# Patient Record
Sex: FEMALE | Employment: FULL TIME | URBAN - METROPOLITAN AREA
[De-identification: names, ages, dates, MRNs, and addresses within clinical notes are randomized per-mention and may not be internally consistent; named-entity substitution may affect disease eponyms.]

---

## 2018-04-05 ENCOUNTER — MYC MEDICAL ADVICE (OUTPATIENT)
Dept: FAMILY MEDICINE | Facility: CLINIC | Age: 48
End: 2018-04-05

## 2018-04-06 NOTE — TELEPHONE ENCOUNTER
From: Emmie Silverman  To: Bettie Clifford MD  Sent: 4/5/2018 9:01 PM CDT  Subject: Updates about my health    I'm planning on using [facility]'s [employer] program for weight loss. Is there anything I should be aware of before I start?

## 2019-04-05 ENCOUNTER — OFFICE VISIT (OUTPATIENT)
Dept: FAMILY MEDICINE | Facility: CLINIC | Age: 49
End: 2019-04-05

## 2019-04-05 VITALS
SYSTOLIC BLOOD PRESSURE: 106 MMHG | HEART RATE: 69 BPM | HEIGHT: 66 IN | DIASTOLIC BLOOD PRESSURE: 62 MMHG | BODY MASS INDEX: 28.28 KG/M2 | OXYGEN SATURATION: 99 % | TEMPERATURE: 98.7 F | WEIGHT: 176 LBS

## 2019-04-05 DIAGNOSIS — H93.8X1 EAR PRESSURE, RIGHT: ICD-10-CM

## 2019-04-05 DIAGNOSIS — J01.00 ACUTE NON-RECURRENT MAXILLARY SINUSITIS: Primary | ICD-10-CM

## 2019-04-05 PROCEDURE — 99213 OFFICE O/P EST LOW 20 MIN: CPT | Performed by: PHYSICIAN ASSISTANT

## 2019-04-05 ASSESSMENT — MIFFLIN-ST. JEOR: SCORE: 1447.14

## 2019-04-05 NOTE — PROGRESS NOTES
"CC: Ear pain    History:  9 days ago, started getting facial pain and nasal drainage. Did feel pressure in ears as well at that time. Then on 3/30/2019 was flying back and ears got worse, painful, especially in right ear. The following day fever, warms, chills, cough, body aches, headaches.     Fortuantely, most symptoms have resolved, but still has a lot of nasal congestion and mild wet cough., in addition to ears remaining under pressure. No obvious facial pain or pressure, but occasional teeth pain.    PMH, MEDICATIONS, ALLERGIES, SOCIAL AND FAMILY HISTORY in [facility] and reviewed by me personally.      ROS negative other than the symptoms noted above in the HPI.        Examination   /62 (BP Location: Left arm, Patient Position: Sitting, Cuff Size: Adult Large)   Pulse 69   Temp 98.7  F (37.1  C) (Oral)   Ht 1.664 m (5' 5.5\")   Wt 79.8 kg (176 lb)   SpO2 99%   BMI 28.84 kg/m         Constitutional: Sitting comfortably, in no acute distress. Vital signs noted  Eyes: pupils equal round reactive to light and accomodation, extra ocular movements intact  Ears: left: external canal and TM free of abnormalities right: mild erythema of TM, and air-fluid levels noted, but no suppuration. No rupture.   Nose: patent, without mucosal abnormalities  Mouth and throat: without erythema or lesions of the mucosa  Neck:  no adenopathy, trachea midline and normal to palpation  Cardiovascular:  regular rate and rhythm, no murmurs, clicks, or gallops  Respiratory:  normal respiratory rate and rhythm, lungs clear to auscultation  SKIN: No jaundice/pallor/rash.   Psychiatric: mentation appears normal and affect normal/bright        A/P    ICD-10-CM    1. Acute non-recurrent maxillary sinusitis J01.00 amoxicillin-clavulanate (AUGMENTIN) 875-125 MG tablet   2. Ear pressure, right H93.8X1        DISCUSSION:  Exam of right ear, not convincing for infection, but does appear to be having eustachian tube dysfunction. Recommended she " take 2-3 more days to try OTC decongestants during the daytime.     If at that time still not improving, given duration of symptoms, recommended she complete 10 day course of Augmentin. She should take this twice daily with food, and should consider taking probiotic or eating yogurt in between. Warned her of possible side effects including loose stool.  Provided her recommendations on OTC therapies based on symptoms. She should contact me in 1 week if symptoms are not improving, or sooner with any intolerable side effects or worsening symptoms.    follow up visit: As needed    [facility]

## 2019-07-26 ENCOUNTER — OFFICE VISIT (OUTPATIENT)
Dept: FAMILY MEDICINE | Facility: CLINIC | Age: 49
End: 2019-07-26

## 2019-07-26 VITALS
SYSTOLIC BLOOD PRESSURE: 104 MMHG | RESPIRATION RATE: 18 BRPM | DIASTOLIC BLOOD PRESSURE: 68 MMHG | HEIGHT: 66 IN | OXYGEN SATURATION: 97 % | WEIGHT: 186 LBS | BODY MASS INDEX: 29.89 KG/M2 | HEART RATE: 65 BPM | TEMPERATURE: 98 F

## 2019-07-26 DIAGNOSIS — Z13.1 SCREENING FOR DIABETES MELLITUS: ICD-10-CM

## 2019-07-26 DIAGNOSIS — Z12.31 ENCOUNTER FOR SCREENING MAMMOGRAM FOR BREAST CANCER: ICD-10-CM

## 2019-07-26 DIAGNOSIS — Z80.0 FAMILY HISTORY OF COLON CANCER: ICD-10-CM

## 2019-07-26 DIAGNOSIS — Z13.220 SCREENING CHOLESTEROL LEVEL: ICD-10-CM

## 2019-07-26 DIAGNOSIS — Z01.419 ENCOUNTER FOR GYNECOLOGICAL EXAMINATION WITHOUT ABNORMAL FINDING: Primary | ICD-10-CM

## 2019-07-26 LAB
CHOLEST SERPL-MCNC: 217 MG/DL (ref 0–199)
CHOLEST/HDLC SERPL: 3 {RATIO} (ref 0–5)
GLUCOSE SERPL-MCNC: 77 MG/DL (ref 60–99)
HDLC SERPL-MCNC: 64 MG/DL (ref 40–150)
HEMOGLOBIN: 14.6 G/DL (ref 11.7–15.7)
LDLC SERPL CALC-MCNC: 132 MG/DL (ref 0–130)
TRIGL SERPL-MCNC: 103 MG/DL (ref 0–149)

## 2019-07-26 PROCEDURE — 85018 HEMOGLOBIN: CPT | Performed by: FAMILY MEDICINE

## 2019-07-26 PROCEDURE — 36415 COLL VENOUS BLD VENIPUNCTURE: CPT | Performed by: FAMILY MEDICINE

## 2019-07-26 PROCEDURE — 88142 CYTOPATH C/V THIN LAYER: CPT | Mod: 90 | Performed by: FAMILY MEDICINE

## 2019-07-26 PROCEDURE — 82947 ASSAY GLUCOSE BLOOD QUANT: CPT | Performed by: FAMILY MEDICINE

## 2019-07-26 PROCEDURE — 80061 LIPID PANEL: CPT | Performed by: FAMILY MEDICINE

## 2019-07-26 PROCEDURE — 99396 PREV VISIT EST AGE 40-64: CPT | Mod: 25 | Performed by: FAMILY MEDICINE

## 2019-07-26 PROCEDURE — 90714 TD VACC NO PRESV 7 YRS+ IM: CPT | Performed by: FAMILY MEDICINE

## 2019-07-26 PROCEDURE — 90471 IMMUNIZATION ADMIN: CPT | Performed by: FAMILY MEDICINE

## 2019-07-26 SDOH — HEALTH STABILITY: MENTAL HEALTH: HOW OFTEN DO YOU HAVE A DRINK CONTAINING ALCOHOL?: 2-4 TIMES A MONTH

## 2019-07-26 SDOH — ECONOMIC STABILITY: TRANSPORTATION INSECURITY
IN THE PAST 12 MONTHS, HAS THE LACK OF TRANSPORTATION KEPT YOU FROM MEDICAL APPOINTMENTS OR FROM GETTING MEDICATIONS?: NO

## 2019-07-26 SDOH — ECONOMIC STABILITY: TRANSPORTATION INSECURITY
IN THE PAST 12 MONTHS, HAS LACK OF TRANSPORTATION KEPT YOU FROM MEETINGS, WORK, OR FROM GETTING THINGS NEEDED FOR DAILY LIVING?: NO

## 2019-07-26 SDOH — ECONOMIC STABILITY: INCOME INSECURITY: HOW HARD IS IT FOR YOU TO PAY FOR THE VERY BASICS LIKE FOOD, HOUSING, MEDICAL CARE, AND HEATING?: NOT HARD AT ALL

## 2019-07-26 SDOH — HEALTH STABILITY: MENTAL HEALTH: HOW MANY STANDARD DRINKS CONTAINING ALCOHOL DO YOU HAVE ON A TYPICAL DAY?: 1 OR 2

## 2019-07-26 SDOH — ECONOMIC STABILITY: FOOD INSECURITY: WITHIN THE PAST 12 MONTHS, YOU WORRIED THAT YOUR FOOD WOULD RUN OUT BEFORE YOU GOT MONEY TO BUY MORE.: NEVER TRUE

## 2019-07-26 SDOH — ECONOMIC STABILITY: FOOD INSECURITY: WITHIN THE PAST 12 MONTHS, THE FOOD YOU BOUGHT JUST DIDN'T LAST AND YOU DIDN'T HAVE MONEY TO GET MORE.: NEVER TRUE

## 2019-07-26 ASSESSMENT — MIFFLIN-ST. JEOR: SCORE: 1487.5

## 2019-07-26 NOTE — NURSING NOTE
Emmie is here for a fasting CPX with PAP.        Patient is here for a full physical exam.    Pre-Visit Screening :  Immunizations : not up to date - will get TD today and be up to date  Colon Screening : NA  Mammogram: is due and to be scheduled by patient  Asthma Action Test/Plan : NA  PHQ9 :  NA  GAD7 :  NA  Patient's  BMI Body mass index is 30.48 kg/m .  Questioned patient about current smoking habits.  Pt. has never smoked.  OK to leave a detailed voice message regarding today's visit Yes, phone # [phone]      ETOH screening:  Questions:  1-How often do you have a drink containing alcohol?                             2 times per month(s)  2-How many drinks containing alcohol do you have on a typical day when you are         Drinking?                              2   3- How often do you have 5 or more drinks on one occasion?                              never

## 2019-07-26 NOTE — PROGRESS NOTES
SUBJECTIVE:  Emmie Silverman is an 49 year old G 2 P 2 woman who presents for   annual gyn exam. Patient's last menstrual period was 07/05/2019. Periods are regular q 28-30 days, lasting   4 days. Dysmenorrhea:none. Cyclic symptoms   include none. No intermenstrual bleeding,   spotting, or discharge.    Current contraception: vasectomy  BLANCA exposure: no  History of abnormal Pap smear: No  Family history of uterine or ovarian cancer: No  Regular self breast exam: Yes  History of abnormal mammogram: No  Family history of breast cancer: No  History of abnormal lipids: No    No past medical history on file.    Family History   Problem Relation Age of Onset     Lipids Father      Lipids Mother      Heart Disease Father         angioplasty     Cancer Sister         basal cell carcinoma     Asthma Mother        Past Surgical History:   Procedure Laterality Date     HC DILATION/CURETTAGE DIAG/THER NON OB  2000    D & C     HC TOOTH EXTRACTION W/FORCEP      molar extraction     LASIK CUSTOMVUE BILATERAL  10/22/2012    Procedure: LASIK CUSTOMVUE BILATERAL;  BILATERAL CUSTOMVUE LASIK WITH INTRALASE  ;  Surgeon: Richar Zapata MD;  Location: [facility]     WAVESCAN SCREENING  10/22/2012    Procedure: WAVESCAN SCREENING;  BILATERAL WAVESCAN SCREENING;  Surgeon: Richar Zapata MD;  Location: [facility]     WAVESCAN SCREENING  10/14/2013    Procedure: WAVESCAN SCREENING;  BILATERAL WAVESCAN ;  Surgeon: Richar Zapata MD;  Location: [facility]     WAVESCAN SCREENING  1/22/2014    Procedure: WAVESCAN SCREENING;  BILATERAL WAVESCREEN;  Surgeon: Richar Zapata MD;  Location: [facility]       No current outpatient medications on file.     No current facility-administered medications for this visit.      Allergies   Allergen Reactions     Aspirin      hives       Social History     Tobacco Use     Smoking status: Never Smoker     Smokeless tobacco: Never Used   Substance Use Topics     Alcohol use: Yes     Alcohol/week: 0.5 oz     Types: 1  "Glasses of wine per week     Frequency: 2-4 times a month     Drinks per session: 1 or 2     Comment: wine       Review Of Systems  Ears/Nose/Throat: negative  Respiratory: No shortness of breath, dyspnea on exertion, cough, or hemoptysis  Cardiovascular: negative  Gastrointestinal: requests early colonoscopy due to family history  Genitourinary: negative    OBJECTIVE:  /68 (BP Location: Right arm, Patient Position: Sitting, Cuff Size: Adult Large)   Pulse 65   Temp 98  F (36.7  C) (Oral)   Ht 1.664 m (5' 5.5\")   Wt 84.4 kg (186 lb)   LMP 07/05/2019   SpO2 97%   BMI 30.48 kg/m    General appearance: healthy, alert, no distress, cooperative and smiling  Skin: Skin color, texture, turgor normal. No rashes or lesions.  Ears: negative  Nose/Sinuses: Nares normal. Septum midline. Mucosa normal. No drainage or sinus tenderness.  Oropharynx: Lips, mucosa, and tongue normal. Teeth and gums normal.  Neck: Neck supple. No adenopathy. Thyroid symmetric, normal size,, Carotids without bruits.  Lungs: negative, Percussion normal. Good diaphragmatic excursion. Lungs clear  Heart: negative, PMI normal. No lifts, heaves, or thrills. RRR. No murmurs, clicks gallops or rub  Breasts: Inspection negative. No nipple discharge or bleeding. No masses.  Abdomen: Abdomen soft, non-tender. BS normal. No masses, organomegaly  Pelvic: External genitalia and vagina normal. Bimanual and rectovaginal normal.  BMI : Body mass index is 30.48 kg/m .    ASSESSMENT:(Z01.419) Encounter for gynecological examination without abnormal finding  (primary encounter diagnosis)  Plan: ThinPrep Pap and HPV (mRNA E6/E7)         ([facility]), CL AFF HEMOGLOBIN (BFP), VENOUS         COLLECTION        Colonoscopy recommended  Exercise encouraged  Fasting lipid profile obtained  Mammogram recommended  PAP smear obtained  Routine breast self-exam encouraged  Seat belt use encouraged  Sunscreen recommended  Await labs      (Z12.31) Encounter for screening " mammogram for breast cancer  Plan: Mammo Screening digital (bilat)            (Z80.0) Family history of colon cancer  Plan: GASTROENTEROLOGY ADULT REF PROCEDURE ONLY         Other; [facility] [phone]        Check with your plan    (Z13.1) Screening for diabetes mellitus  Plan: Glucose Fasting (BFP), VENOUS COLLECTION            (Z13.220) Screening cholesterol level  Plan: Lipid Panel (BFP), VENOUS COLLECTION                Dx:  1)  Pap smear  2)  Mammography, lipids at appropriate intervals      PE:  Reviewed health maintenance including diet, regular exercise   and periodic exams.

## 2019-07-31 LAB
CLINICAL HISTORY - QUEST: NORMAL
COMMENT - QUEST: NORMAL
CYTOTECHNOLOGIST - QUEST: NORMAL
DESCRIPTIVE DIAGNOSIS - QUEST: NORMAL
LAST PAP DX - QUEST: NORMAL
LMP - QUEST: NORMAL
PREV BX DX - QUEST: NORMAL
SOURCE: NORMAL
STATEMENT OF ADEQUACY - QUEST: NORMAL

## 2019-08-02 DIAGNOSIS — Z12.31 ENCOUNTER FOR SCREENING MAMMOGRAM FOR BREAST CANCER: ICD-10-CM

## 2019-08-02 LAB — MAMMOGRAM: NORMAL

## 2019-09-27 ENCOUNTER — HEALTH MAINTENANCE LETTER (OUTPATIENT)
Age: 49
End: 2019-09-27

## 2021-01-09 ENCOUNTER — HEALTH MAINTENANCE LETTER (OUTPATIENT)
Age: 51
End: 2021-01-09

## 2021-03-13 ENCOUNTER — HEALTH MAINTENANCE LETTER (OUTPATIENT)
Age: 51
End: 2021-03-13

## 2021-10-23 ENCOUNTER — HEALTH MAINTENANCE LETTER (OUTPATIENT)
Age: 51
End: 2021-10-23

## 2021-11-15 ENCOUNTER — OFFICE VISIT (OUTPATIENT)
Dept: FAMILY MEDICINE | Facility: CLINIC | Age: 51
End: 2021-11-15

## 2021-11-15 VITALS
DIASTOLIC BLOOD PRESSURE: 74 MMHG | TEMPERATURE: 97.6 F | BODY MASS INDEX: 33.56 KG/M2 | OXYGEN SATURATION: 97 % | WEIGHT: 208.8 LBS | SYSTOLIC BLOOD PRESSURE: 108 MMHG | HEIGHT: 66 IN | HEART RATE: 86 BPM

## 2021-11-15 DIAGNOSIS — M25.551 HIP PAIN, RIGHT: ICD-10-CM

## 2021-11-15 DIAGNOSIS — Z11.4 SCREENING FOR HIV (HUMAN IMMUNODEFICIENCY VIRUS): ICD-10-CM

## 2021-11-15 DIAGNOSIS — Z11.59 NEED FOR HEPATITIS C SCREENING TEST: ICD-10-CM

## 2021-11-15 DIAGNOSIS — Z00.00 ROUTINE GENERAL MEDICAL EXAMINATION AT A HEALTH CARE FACILITY: Primary | ICD-10-CM

## 2021-11-15 DIAGNOSIS — Z12.11 SCREEN FOR COLON CANCER: ICD-10-CM

## 2021-11-15 DIAGNOSIS — Z80.41 FAMILY HISTORY OF MALIGNANT NEOPLASM OF OVARY: ICD-10-CM

## 2021-11-15 DIAGNOSIS — K42.9 UMBILICAL HERNIA WITHOUT OBSTRUCTION AND WITHOUT GANGRENE: ICD-10-CM

## 2021-11-15 DIAGNOSIS — Z80.0 FAMILY HISTORY OF COLON CANCER: ICD-10-CM

## 2021-11-15 DIAGNOSIS — Z12.31 VISIT FOR SCREENING MAMMOGRAM: ICD-10-CM

## 2021-11-15 DIAGNOSIS — H90.3 BILATERAL SENSORINEURAL HEARING LOSS: ICD-10-CM

## 2021-11-15 DIAGNOSIS — R10.11 ABDOMINAL PAIN, RIGHT UPPER QUADRANT: ICD-10-CM

## 2021-11-15 LAB
ALBUMIN SERPL-MCNC: 4.7 G/DL (ref 3.6–5.1)
ALBUMIN/GLOB SERPL: 1.7 {RATIO} (ref 1–2.5)
ALP SERPL-CCNC: 76 U/L (ref 33–130)
ALT 1742-6: 10 U/L (ref 0–32)
AST 1920-8: 14 U/L (ref 0–35)
BILIRUB SERPL-MCNC: 0.8 MG/DL (ref 0.2–1.2)
BUN SERPL-MCNC: 9 MG/DL (ref 7–25)
BUN/CREATININE RATIO: 12 (ref 6–22)
CALCIUM SERPL-MCNC: 9.2 MG/DL (ref 8.6–10.3)
CHLORIDE SERPLBLD-SCNC: 106.9 MMOL/L (ref 98–110)
CHOLEST SERPL-MCNC: 226 MG/DL (ref 0–199)
CHOLEST/HDLC SERPL: 3 {RATIO} (ref 0–5)
CO2 SERPL-SCNC: 27.6 MMOL/L (ref 20–32)
CREAT SERPL-MCNC: 0.75 MG/DL (ref 0.6–1.3)
GLOBULIN, CALCULATED - QUEST: 2.7 (ref 1.9–3.7)
GLUCOSE SERPL-MCNC: 104 MG/DL (ref 60–99)
HDLC SERPL-MCNC: 65 MG/DL (ref 40–150)
LDLC SERPL CALC-MCNC: 140 MG/DL (ref 0–130)
POTASSIUM SERPL-SCNC: 4.3 MMOL/L (ref 3.5–5.3)
PROT SERPL-MCNC: 7.4 G/DL (ref 6.1–8.1)
SODIUM SERPL-SCNC: 141.3 MMOL/L (ref 135–146)
TRIGL SERPL-MCNC: 107 MG/DL (ref 0–149)

## 2021-11-15 PROCEDURE — 80053 COMPREHEN METABOLIC PANEL: CPT | Performed by: FAMILY MEDICINE

## 2021-11-15 PROCEDURE — 36415 COLL VENOUS BLD VENIPUNCTURE: CPT | Performed by: FAMILY MEDICINE

## 2021-11-15 PROCEDURE — 86803 HEPATITIS C AB TEST: CPT | Mod: 90 | Performed by: FAMILY MEDICINE

## 2021-11-15 PROCEDURE — 80061 LIPID PANEL: CPT | Performed by: FAMILY MEDICINE

## 2021-11-15 PROCEDURE — 87389 HIV-1 AG W/HIV-1&-2 AB AG IA: CPT | Mod: 90 | Performed by: FAMILY MEDICINE

## 2021-11-15 PROCEDURE — 99214 OFFICE O/P EST MOD 30 MIN: CPT | Mod: 25 | Performed by: FAMILY MEDICINE

## 2021-11-15 PROCEDURE — 99396 PREV VISIT EST AGE 40-64: CPT | Mod: 25 | Performed by: FAMILY MEDICINE

## 2021-11-15 ASSESSMENT — MIFFLIN-ST. JEOR: SCORE: 1580.92

## 2021-11-15 NOTE — NURSING NOTE
Chief Complaint   Patient presents with     Physical     fasting today      Hernia     soreness around belly button     Pain     lower abdominal pain in right quadrant, wraps around to her back, occurs on and off      Bursitis     right sided hip pain     Pre-visit Screening:  Immunizations:  up to date  Colonoscopy:  NA  Mammogram: is due and ordered today  Asthma Action Test/Plan:  NA  PHQ9:  NA  GAD7:  NA  Questioned patient about current smoking habits Pt. has never smoked.  Ok to leave detailed message on voice mail for today's visit only Yes, phone # [phone]

## 2021-11-15 NOTE — PATIENT INSTRUCTIONS
Preventive Health Recommendations  Female Ages 40 to 49    Yearly exam:     See your health care provider every year in order to  1. Review health changes.   2. Discuss preventive care.    3. Review your medicines if your doctor prescribed any.      Get a Pap test every three years (unless you have an abnormal result and your provider advises testing more often).      If you get Pap tests with HPV test, you only need to test every 5 years, unless you have an abnormal result. You do not need a Pap test if your uterus was removed (hysterectomy) and you have not had cancer.      You should be tested each year for STDs (sexually transmitted diseases), if you're at risk.     Ask your doctor if you should have a mammogram.      Have a colonoscopy (test for colon cancer) if someone in your family has had colon cancer or polyps before age 50.       Have a cholesterol test every 5 years.       Have a diabetes test (fasting glucose) after age 45. If you are at risk for diabetes, you should have this test every 3 years.    Shots: Get a flu shot each year. Get a tetanus shot every 10 years.     Nutrition:     Eat at least 5 servings of fruits and vegetables each day.    Eat whole-grain bread, whole-wheat pasta and brown rice instead of white grains and rice.    Get adequate Calcium and Vitamin D.      Lifestyle    Exercise at least 150 minutes a week (an average of 30 minutes a day, 5 days a week). This will help you control your weight and prevent disease.    Limit alcohol to one drink per day.    No smoking.     Wear sunscreen to prevent skin cancer.    See your dentist every six months for an exam and cleaning.  ASSESSMENT/PLAN:   1. Routine general medical examination at a health care facility    - VENOUS COLLECTION  - Lipid Panel (BFP)    2. Visit for screening mammogram    - Mammo Screening digital (bilat)    3. Screen for colon cancer  She said she is due again now, will call the office where she had it to  "reschedule.    4. Family history of colon cancer  Referral for genetic counseling.  - Cancer Risk Mgmt/Cancer Genetic Counseling Referral; Future    5. Abdominal pain, right upper quadrant  Check labs. Schedule ultrasound.  - Radiology Referral  - Comprehensive Metobolic Panel (BFP)  - US Abdomen Complete; Future    6. Screening for HIV (human immunodeficiency virus)    - HIV 1/2 Agn Kristen 4th Gen w Reflex ([facility])    7. Need for hepatitis C screening test    - HEPATITIS C ANTIBODY ([facility])    8. Family history of malignant neoplasm of ovary  Genetic counseling.  - Cancer Risk Mgmt/Cancer Genetic Counseling Referral; Future    9. Hip pain, right  She will schedule followup apt for this.    10. Umbilical hernia without obstruction and without gangrene  Recommend referral to general surgeon. She will wait on this for now.    11. Bilateral sensorineural hearing loss  Schedule with hearing health    Patient has been advised of split billing requirements and indicates understanding: Yes  COUNSELING:  Reviewed preventive health counseling, as reflected in patient instructions       Regular exercise       Healthy diet/nutrition    Estimated body mass index is 34.22 kg/m  as calculated from the following:    Height as of this encounter: 1.664 m (5' 5.5\").    Weight as of this encounter: 94.7 kg (208 lb 12.8 oz).    Weight management plan: Discussed healthy diet and exercise guidelines    She reports that she has never smoked. She has never used smokeless tobacco.        Jyotsna De La Cruz MD  [facility]      "

## 2021-11-15 NOTE — PROGRESS NOTES
SUBJECTIVE:   CC: Emmie Silverman is an 51 year old woman who presents for preventive health visit.       Patient has been advised of split billing requirements and indicates understanding: Yes  HPI    Here for a physical.  Is doing well.  Went with her mother for genetic counseliing. She hasn't done it herself. Has several positive cancers in her family.    Pain around her belly button at times, hurts with situps. Not sure if this is a hernia. Not sure if there is a lump. Sometimes it feels hard in there.    occ has gallbladder stuff, has had this for years. Can control with diet. Sometimes will be uncomfortable  Then it goes away. Pain after eating, feels tight and uncomfortable. Hasn't had ultrasound.    Wants to have a hearting check. Used to be a drummer. Harder with masks also.  Also has tinnitus.    Also some right hip pain.          Today's PHQ-2 Score:   PHQ-2 ( 1999 Pfizer) 11/15/2021   Q1: Little interest or pleasure in doing things 0   Q2: Feeling down, depressed or hopeless 0   PHQ-2 Score 0         Do you feel safe in your environment? Yes    Have you ever done Advance Care Planning? (For example, a Health Directive, POLST, or a discussion with a medical provider or your loved ones about your wishes): Yes, patient states has an Advance Care Planning document and will bring a copy to the clinic.    Social History     Tobacco Use     Smoking status: Never Smoker     Smokeless tobacco: Never Used   Substance Use Topics     Alcohol use: Yes     Comment: wine 1-2 drinks a week         No flowsheet data found.    Reviewed orders with patient.  Reviewed health maintenance and updated orders accordingly - Yes  BP Readings from Last 3 Encounters:   11/15/21 108/74   07/26/19 104/68   04/05/19 106/62    Wt Readings from Last 3 Encounters:   11/15/21 94.7 kg (208 lb 12.8 oz)   07/26/19 84.4 kg (186 lb)   04/05/19 79.8 kg (176 lb)                  Patient Active Problem List   Diagnosis     Exposure to noise      Bee sting reaction     Health Care Home     ACP (advance care planning)     Obesity due to excess calories, unspecified obesity severity     Screen for colon cancer     Family history of malignant neoplasm of ovary     Umbilical hernia without obstruction and without gangrene     Past Surgical History:   Procedure Laterality Date     HC DILATION/CURETTAGE DIAG/THER NON OB  2000    D & C     HC TOOTH EXTRACTION W/FORCEP      molar extraction     LASIK CUSTOMVUE BILATERAL  10/22/2012    Procedure: LASIK CUSTOMVUE BILATERAL;  BILATERAL CUSTOMVUE LASIK WITH INTRALASE  ;  Surgeon: Richar Zapata MD;  [facility]     WAVESCAN SCREENING  10/22/2012    Procedure: WAVESCAN SCREENING;  BILATERAL WAVESCAN SCREENING;  Surgeon: Richar Zapata MD;  [facility]     WAVESCAN SCREENING  10/14/2013    Procedure: WAVESCAN SCREENING;  BILATERAL WAVESCAN ;  Surgeon: Richar Zapata MD;  [facility]     WAVESCAN SCREENING  1/22/2014    Procedure: WAVESCAN SCREENING;  BILATERAL WAVESCREEN;  Surgeon: Richar Zapata MD;  Location: [facility]       Social History     Tobacco Use     Smoking status: Never Smoker     Smokeless tobacco: Never Used   Substance Use Topics     Alcohol use: Yes     Comment: wine 1-2 drinks a week     Family History   Problem Relation Age of Onset     Lipids Mother      Asthma Mother      Ovarian Cancer Mother         no genetic     Lipids Father      Heart Disease Father         angioplasty     Alzheimer Disease Sister      Cancer Sister         basal cell carcinoma     Colon Cancer Cousin         diagnose age 52     Ovarian Cancer Cousin         diagnosed early 50s         No current outpatient medications on file.       Breast Cancer Screening:  Any new diagnosis of family breast, ovarian, or bowel cancer?     FHS-7: No flowsheet data found.  History normal.    Pertinent mammograms are reviewed under the imaging tab.    History of abnormal Pap smear: history normal, up to date  Still gets  "her periods,  with vasectomy     Reviewed and updated as needed this visit by clinical staff  Tobacco    Problems            Reviewed and updated as needed this visit by Provider               No past medical history on file.   Past Surgical History:   Procedure Laterality Date     HC DILATION/CURETTAGE DIAG/THER NON OB  2000    D & C     HC TOOTH EXTRACTION W/FORCEP      molar extraction     LASIK CUSTOMVUE BILATERAL  10/22/2012    Procedure: LASIK CUSTOMVUE BILATERAL;  BILATERAL CUSTOMVUE LASIK WITH INTRALASE  ;  Surgeon: Richar Zapata MD;  [facility]     WAVESCAN SCREENING  10/22/2012    Procedure: WAVESCAN SCREENING;  BILATERAL WAVESCAN SCREENING;  Surgeon: Richar Zapata MD;  [facility]     WAVESCAN SCREENING  10/14/2013    Procedure: WAVESCAN SCREENING;  BILATERAL WAVESCAN ;  Surgeon: Richar Zapata MD;  [facility]     WAVESCAN SCREENING  1/22/2014    Procedure: WAVESCAN SCREENING;  BILATERAL WAVESCREEN;  Surgeon: Richar Zapata MD;  Location: [facility]       Review of Systems  CONSTITUTIONAL: NEGATIVE for fever, chills, change in weight  INTEGUMENTARY/SKIN: NEGATIVE for worrisome rashes, moles or lesions  EYES: NEGATIVE for vision changes or irritation  ENT: NEGATIVE for ear, mouth and throat problems  RESP: NEGATIVE for significant cough or SOB  BREAST: NEGATIVE for masses, tenderness or discharge  CV: NEGATIVE for chest pain, palpitations or peripheral edema  GI: NEGATIVE for nausea, abdominal pain, heartburn, or change in bowel habits  : NEGATIVE for unusual urinary or vaginal symptoms. No vaginal bleeding.  MUSCULOSKELETAL: NEGATIVE for significant arthralgias or myalgia  NEURO: NEGATIVE for weakness, dizziness or paresthesias  PSYCHIATRIC: NEGATIVE for changes in mood or affect        OBJECTIVE:   /74 (BP Location: Right arm, Patient Position: Sitting, Cuff Size: Adult Large)   Pulse 86   Temp 97.6  F (36.4  C) (Temporal)   Ht 1.664 m (5' 5.5\")   Wt 94.7 kg " (208 lb 12.8 oz)   LMP 10/25/2021   SpO2 97%   BMI 34.22 kg/m    Physical Exam  GENERAL: healthy, alert and no distress  EYES: Eyes grossly normal to inspection, PERRL and conjunctivae and sclerae normal  HENT: ear canals and TM's normal, nose and mouth without ulcers or lesions  NECK: no adenopathy, no asymmetry, masses, or scars and thyroid normal to palpation  RESP: lungs clear to auscultation - no rales, rhonchi or wheezes  BREAST: normal without masses, tenderness or nipple discharge and no palpable axillary masses or adenopathy  CV: regular rate and rhythm, normal S1 S2, no S3 or S4, no murmur, click or rub, no peripheral edema and peripheral pulses strong  ABDOMEN: soft, nontender, no hepatosplenomegaly, no masses and bowel sounds normal   (female): normal female external genitalia, normal urethral meatus, vaginal mucosa pink, moist, well rugated, and normal cervix/adnexa/uterus without masses or discharge  MS: no gross musculoskeletal defects noted, no edema  SKIN: no suspicious lesions or rashes  NEURO: Normal strength and tone, mentation intact and speech normal  PSYCH: mentation appears normal, affect normal/bright  Non tender right upper abd  Umbilical area--non tender, no obvious hernia    Diagnostic Test Results:  Labs reviewed in Epic  No results found for any visits on 11/15/21.    ASSESSMENT/PLAN:   1. Routine general medical examination at a health care facility    - VENOUS COLLECTION  - Lipid Panel (BFP)    2. Visit for screening mammogram    - Mammo Screening digital (bilat)    3. Screen for colon cancer  She said she is due again now, will call the office where she had it to reschedule.    4. Family history of colon cancer  Referral for genetic counseling.  - Cancer Risk Mgmt/Cancer Genetic Counseling Referral; Future    5. Abdominal pain, right upper quadrant  Check labs. Schedule ultrasound.  - Radiology Referral  - Comprehensive Metobolic Panel (BFP)  - US Abdomen Complete; Future    6.  "Screening for HIV (human immunodeficiency virus)    - HIV 1/2 Agn Kristen 4th Gen w Reflex ([facility])    7. Need for hepatitis C screening test    - HEPATITIS C ANTIBODY ([facility])    8. Family history of malignant neoplasm of ovary  Genetic counseling.  - Cancer Risk Mgmt/Cancer Genetic Counseling Referral; Future    9. Hip pain, right  She will schedule followup apt for this.    10. Umbilical hernia without obstruction and without gangrene  Recommend referral to general surgeon. She will wait on this for now.    11. Bilateral sensorineural hearing loss  Schedule with hearing health    Patient has been advised of split billing requirements and indicates understanding: Yes  COUNSELING:  Reviewed preventive health counseling, as reflected in patient instructions       Regular exercise       Healthy diet/nutrition    Estimated body mass index is 34.22 kg/m  as calculated from the following:    Height as of this encounter: 1.664 m (5' 5.5\").    Weight as of this encounter: 94.7 kg (208 lb 12.8 oz).    Weight management plan: Discussed healthy diet and exercise guidelines    She reports that she has never smoked. She has never used smokeless tobacco.        Jyotsna De La Cruz MD  [facility]  "

## 2021-11-16 LAB
HCV AB - QUEST: NORMAL
HIV 1/2 AGN ABY 4TH GEN WITH REFLEX: NORMAL
SIGNAL TO CUT OFF - QUEST: 0.02

## 2022-01-07 ENCOUNTER — TRANSFERRED RECORDS (OUTPATIENT)
Dept: FAMILY MEDICINE | Facility: CLINIC | Age: 52
End: 2022-01-07

## 2022-04-09 ENCOUNTER — HEALTH MAINTENANCE LETTER (OUTPATIENT)
Age: 52
End: 2022-04-09

## 2022-06-07 ENCOUNTER — OFFICE VISIT (OUTPATIENT)
Dept: FAMILY MEDICINE | Facility: CLINIC | Age: 52
End: 2022-06-07

## 2022-06-07 VITALS
HEART RATE: 75 BPM | TEMPERATURE: 97.4 F | HEIGHT: 66 IN | DIASTOLIC BLOOD PRESSURE: 76 MMHG | SYSTOLIC BLOOD PRESSURE: 112 MMHG | OXYGEN SATURATION: 98 % | BODY MASS INDEX: 33.75 KG/M2 | WEIGHT: 210 LBS

## 2022-06-07 DIAGNOSIS — Z86.16 PERSONAL HISTORY OF COVID-19: ICD-10-CM

## 2022-06-07 DIAGNOSIS — R21 RASH: Primary | ICD-10-CM

## 2022-06-07 PROCEDURE — 99214 OFFICE O/P EST MOD 30 MIN: CPT | Performed by: FAMILY MEDICINE

## 2022-06-07 RX ORDER — HYDROXYZINE PAMOATE 25 MG/1
25 CAPSULE ORAL 3 TIMES DAILY PRN
Qty: 30 CAPSULE | Refills: 0 | Status: SHIPPED | OUTPATIENT
Start: 2022-06-07 | End: 2022-11-18

## 2022-06-07 NOTE — PROGRESS NOTES
"Assessment & Plan   Problem List Items Addressed This Visit    None     Visit Diagnoses     Rash    -  Primary    Relevant Medications    hydrOXYzine (VISTARIL) 25 MG capsule    Personal history of COVID-19             1. Rash  This looks to be very consistent with viral rash, continue to treat symptoms, she can also take   - hydrOXYzine (VISTARIL) 25 MG capsule; Take 1 capsule (25 mg) by mouth 3 times daily as needed for itching  Dispense: 30 capsule; Refill: 0    2. Personal history of COVID-19             BMI:   Estimated body mass index is 34.41 kg/m  as calculated from the following:    Height as of this encounter: 1.664 m (5' 5.5\").    Weight as of this encounter: 95.3 kg (210 lb).         FUTURE APPOINTMENTS:       - Follow-up visit as needed, if changes or worsening symptoms.    No follow-ups on file.    Jyotsna De La Cruz MD  [facility]    Subjective     Nursing Notes:   Roselyn Bautista CMA  6/7/2022 10:54 AM  Signed  Chief Complaint   Patient presents with     Rash     POS covid on 05/28, has been feeling fine no major symptoms, yesterday noticed itching on chest, rash has now spread to entire body, itchy and tingling sensation, red and splotchy like rash      Pre-visit Screening:  Immunizations:  up to date  Colonoscopy:  is up to date  Mammogram: is up to date  Asthma Action Test/Plan:  NA  PHQ9:  NA  GAD7:  NA  Questioned patient about current smoking habits Pt. has never smoked.  Ok to leave detailed message on voice mail for today's visit only Yes, phone # [phone]           Emmie Silverman is a 51 year old female who presents to clinic today for the following health issues   HPI     She is on day 10 of covid. She has a rash. Started with some itching a couple of days ago and the rash has gradually spread. Now its on her neck, face and torso, upper thighs and both arms. It's patchy. She tried some benadryl last night and then claritin this morning, which she already takes " "regularly for allergies. Not sure if this made a difference.  No fevers. Her covid symptoms were otherwise very mild and more like a cold.        Review of Systems   Constitutional, HEENT, cardiovascular, pulmonary, gi and gu systems are negative, except as otherwise noted.      Objective    /76 (BP Location: Right arm, Patient Position: Sitting, Cuff Size: Adult Large)   Pulse 75   Temp 97.4  F (36.3  C) (Temporal)   Ht 1.664 m (5' 5.5\")   Wt 95.3 kg (210 lb)   SpO2 98%   BMI 34.41 kg/m    Body mass index is 34.41 kg/m .  Physical Exam   GENERAL: healthy, alert and no distress  MS: no gross musculoskeletal defects noted, no edema  NEURO: Normal strength and tone, mentation intact and speech normal  PSYCH: mentation appears normal, affect normal/bright  Extensive raised red rash, lacy, on torso, chest, face/neck, upper thighs and arms          "

## 2022-06-07 NOTE — NURSING NOTE
Chief Complaint   Patient presents with     Rash     POS covid on 05/28, has been feeling fine no major symptoms, yesterday noticed itching on chest, rash has now spread to entire body, itchy and tingling sensation, red and splotchy like rash      Pre-visit Screening:  Immunizations:  up to date  Colonoscopy:  is up to date  Mammogram: is up to date  Asthma Action Test/Plan:  NA  PHQ9:  NA  GAD7:  NA  Questioned patient about current smoking habits Pt. has never smoked.  Ok to leave detailed message on voice mail for today's visit only Yes, phone # [phone]

## 2022-10-09 ENCOUNTER — HEALTH MAINTENANCE LETTER (OUTPATIENT)
Age: 52
End: 2022-10-09

## 2022-11-18 ENCOUNTER — OFFICE VISIT (OUTPATIENT)
Dept: FAMILY MEDICINE | Facility: CLINIC | Age: 52
End: 2022-11-18

## 2022-11-18 VITALS
TEMPERATURE: 97.9 F | SYSTOLIC BLOOD PRESSURE: 118 MMHG | HEART RATE: 87 BPM | OXYGEN SATURATION: 97 % | WEIGHT: 224.4 LBS | DIASTOLIC BLOOD PRESSURE: 78 MMHG | BODY MASS INDEX: 36.07 KG/M2 | HEIGHT: 66 IN

## 2022-11-18 DIAGNOSIS — E66.09 OBESITY DUE TO EXCESS CALORIES, UNSPECIFIED OBESITY SEVERITY: ICD-10-CM

## 2022-11-18 DIAGNOSIS — Z12.4 SCREENING FOR CERVICAL CANCER: ICD-10-CM

## 2022-11-18 DIAGNOSIS — Z80.41 FAMILY HISTORY OF MALIGNANT NEOPLASM OF OVARY: ICD-10-CM

## 2022-11-18 DIAGNOSIS — Z00.00 ENCOUNTER FOR ROUTINE HISTORY AND PHYSICAL EXAM IN FEMALE PATIENT: Primary | ICD-10-CM

## 2022-11-18 DIAGNOSIS — R10.31 ABDOMINAL PAIN, RIGHT LOWER QUADRANT: ICD-10-CM

## 2022-11-18 DIAGNOSIS — M25.551 HIP PAIN, RIGHT: ICD-10-CM

## 2022-11-18 DIAGNOSIS — R91.8 PULMONARY NODULES: ICD-10-CM

## 2022-11-18 PROBLEM — Z12.11 SCREEN FOR COLON CANCER: Status: RESOLVED | Noted: 2021-11-15 | Resolved: 2022-11-18

## 2022-11-18 LAB
CHOLEST SERPL-MCNC: 228 MG/DL (ref 0–199)
CHOLEST/HDLC SERPL: 4 {RATIO} (ref 0–5)
GLUCOSE SERPL-MCNC: 78 MG/DL (ref 60–99)
HDLC SERPL-MCNC: 56 MG/DL (ref 40–150)
LDLC SERPL CALC-MCNC: 146 MG/DL (ref 0–130)
TRIGL SERPL-MCNC: 128 MG/DL (ref 0–149)

## 2022-11-18 PROCEDURE — 99396 PREV VISIT EST AGE 40-64: CPT | Mod: 25 | Performed by: FAMILY MEDICINE

## 2022-11-18 PROCEDURE — 36415 COLL VENOUS BLD VENIPUNCTURE: CPT | Performed by: FAMILY MEDICINE

## 2022-11-18 PROCEDURE — 82947 ASSAY GLUCOSE BLOOD QUANT: CPT | Performed by: FAMILY MEDICINE

## 2022-11-18 PROCEDURE — 87624 HPV HI-RISK TYP POOLED RSLT: CPT | Mod: 90 | Performed by: FAMILY MEDICINE

## 2022-11-18 PROCEDURE — 99214 OFFICE O/P EST MOD 30 MIN: CPT | Mod: 25 | Performed by: FAMILY MEDICINE

## 2022-11-18 PROCEDURE — 80061 LIPID PANEL: CPT | Performed by: FAMILY MEDICINE

## 2022-11-18 PROCEDURE — 88142 CYTOPATH C/V THIN LAYER: CPT | Mod: 90 | Performed by: FAMILY MEDICINE

## 2022-11-18 RX ORDER — TIRZEPATIDE 2.5 MG/.5ML
INJECTION, SOLUTION SUBCUTANEOUS
COMMUNITY
Start: 2022-11-16 | End: 2023-11-21

## 2022-11-18 NOTE — PROGRESS NOTES
SUBJECTIVE:   CC: Emmie is an 52 year old who presents for preventive health visit.   Patient has been advised of split billing requirements and indicates understanding: Yes  HPI    Here for a physical.    Is working with a weight loss clinic, working with a program and is using mounjaro injections for weight loss.     Has right hip pain, for over a year, got better but then seemed to get worse again. She brought this up last year and was going to come back to talk about this further. Really notices this at night.    Vaginal dryness--but is still getting periods except over the past month or so. Sex is more painful, doesn't want estrogen now.    Will be seeing genetic counselor.    Also has had pain in right abd, has had the pain for years. Last year did a us. Not sure if this is related to her hip. The us was normal.  Seems to be in the right side of the abd. Weird aches and pains.doesn't seem to have anything to do with eating. Has tried to improve her diet. But the pain still seems to come every once in awhile.        Today's PHQ-2 Score:   PHQ-2 ( 1999 Pfizer) 6/7/2022   Q1: Little interest or pleasure in doing things 0   Q2: Feeling down, depressed or hopeless 0   PHQ-2 Score 0   PHQ-2 Total Score (12-17 Years)- Positive if 3 or more points; Administer PHQ-A if positive -           Social History     Tobacco Use     Smoking status: Never     Smokeless tobacco: Never   Substance Use Topics     Alcohol use: Yes     Alcohol/week: 1.0 standard drink     Types: 1 Glasses of wine per week     No concerns    No flowsheet data found.    Reviewed orders with patient.  Reviewed health maintenance and updated orders accordingly - Yes  BP Readings from Last 3 Encounters:   11/18/22 118/78   06/07/22 112/76   11/15/21 108/74    Wt Readings from Last 3 Encounters:   11/18/22 101.8 kg (224 lb 6.4 oz)   06/07/22 95.3 kg (210 lb)   11/15/21 94.7 kg (208 lb 12.8 oz)                  Patient Active Problem List   Diagnosis      Bee sting reaction     Health Care Home     ACP (advance care planning)     Obesity due to excess calories, unspecified obesity severity     Family history of malignant neoplasm of ovary     Umbilical hernia without obstruction and without gangrene     Past Surgical History:   Procedure Laterality Date     HC DILATION/CURETTAGE DIAG/THER NON OB  2000    D & C     HC TOOTH EXTRACTION W/FORCEP      molar extraction     LASIK CUSTOMVUE BILATERAL  10/22/2012    Procedure: LASIK CUSTOMVUE BILATERAL;  BILATERAL CUSTOMVUE LASIK WITH INTRALASE  ;  Surgeon: Richar Zapata MD;  [facility]     WAVESCAN SCREENING  10/22/2012    Procedure: WAVESCAN SCREENING;  BILATERAL WAVESCAN SCREENING;  Surgeon: Richar Zapata MD;  Location:  [facility]     WAVESCAN SCREENING  10/14/2013    Procedure: WAVESCAN SCREENING;  BILATERAL WAVESCAN ;  Surgeon: Richar Zapata MD;  Location:  [facility]     WAVESCAN SCREENING  1/22/2014    Procedure: WAVESCAN SCREENING;  BILATERAL WAVESCREEN;  Surgeon: Richar Zapata MD;  Location: [facility]       Social History     Tobacco Use     Smoking status: Never     Smokeless tobacco: Never   Substance Use Topics     Alcohol use: Yes     Alcohol/week: 1.0 standard drink     Types: 1 Glasses of wine per week     Family History   Problem Relation Age of Onset     Lipids Mother      Asthma Mother      Ovarian Cancer Mother         no genetic     Lipids Father      Heart Disease Father         angioplasty     Alzheimer Disease Sister      Cancer Sister         basal cell carcinoma     Colon Cancer Cousin         diagnose age 52     Ovarian Cancer Cousin         diagnosed early 50s         Current Outpatient Medications   Medication Sig Dispense Refill     MOUNJARO 2.5 MG/0.5ML SOPN INJECT 2.5 MG UNDER THE SKIN ONCE WEEKLY FOR  4 WEEK FOR THE FIRST MONTH.         Breast Cancer Screening:  Any new diagnosis of family breast, ovarian, or bowel cancer? Yes       FHS-7:   Breast CA Risk Assessment (FHS-7)  11/18/2022   Did any of your first-degree relatives have breast or ovarian cancer? Yes   Did any of your relatives have bilateral breast cancer? No   Did any man in your family have breast cancer? No   Did any woman in your family have breast and ovarian cancer? No   Did any woman in your family have breast cancer before age 50 y? No   Do you have 2 or more relatives with breast and/or ovarian cancer? No   Do you have 2 or more relatives with breast and/or bowel cancer? Yes     Referred last year to genetic counselor last year. She is still considering making this apt.    Pertinent mammograms are reviewed under the imaging tab.  Mammogram up to date.    History of abnormal Pap smear: history normal pap. Regular periods, but recent skipped period.  No chance pregnancy     Reviewed and updated as needed this visit by clinical staff   Tobacco  Allergies               Reviewed and updated as needed this visit by Provider                 No past medical history on file.   Past Surgical History:   Procedure Laterality Date     HC DILATION/CURETTAGE DIAG/THER NON OB  2000    D & C     HC TOOTH EXTRACTION W/FORCEP      molar extraction     LASIK CUSTOMVUE BILATERAL  10/22/2012    Procedure: LASIK CUSTOMVUE BILATERAL;  BILATERAL CUSTOMVUE LASIK WITH INTRALASE  ;  Surgeon: Richar Zapata MD;  Location: [facility]     WAVESCAN SCREENING  10/22/2012    Procedure: WAVESCAN SCREENING;  BILATERAL WAVESCAN SCREENING;  Surgeon: Richar Zapata MD;  Location: [facility]     WAVESCAN SCREENING  10/14/2013    Procedure: WAVESCAN SCREENING;  BILATERAL WAVESCAN ;  Surgeon: Richar Zapata MD;  Location: [facility]     WAVESCAN SCREENING  1/22/2014    Procedure: WAVESCAN SCREENING;  BILATERAL WAVESCREEN;  Surgeon: Richar Zapata MD;  Location: [facility]       Review of Systems  CONSTITUTIONAL: NEGATIVE for fever, chills, change in weight  INTEGUMENTARU/SKIN: NEGATIVE for worrisome rashes, moles or lesions  EYES: NEGATIVE for vision changes or  "irritation  ENT: NEGATIVE for ear, mouth and throat problems  RESP: NEGATIVE for significant cough or SOB  BREAST: NEGATIVE for masses, tenderness or discharge  CV: NEGATIVE for chest pain, palpitations or peripheral edema  GI: NEGATIVE for nausea, abdominal pain, heartburn, or change in bowel habits  : NEGATIVE for unusual urinary or vaginal symptoms. Periods are regular.  MUSCULOSKELETAL: NEGATIVE for significant arthralgias or myalgia  NEURO: NEGATIVE for weakness, dizziness or paresthesias  PSYCHIATRIC: NEGATIVE for changes in mood or affect     OBJECTIVE:   /78 (BP Location: Right arm, Patient Position: Sitting, Cuff Size: Adult Large)   Pulse 87   Temp 97.9  F (36.6  C) (Temporal)   Ht 1.664 m (5' 5.5\")   Wt 101.8 kg (224 lb 6.4 oz)   LMP 10/05/2022   SpO2 97%   BMI 36.77 kg/m    Physical Exam  GENERAL: healthy, alert and no distress  EYES: Eyes grossly normal to inspection, PERRL and conjunctivae and sclerae normal  HENT: ear canals and TM's normal, nose and mouth without ulcers or lesions  NECK: no adenopathy, no asymmetry, masses, or scars and thyroid normal to palpation  RESP: lungs clear to auscultation - no rales, rhonchi or wheezes  BREAST: normal without masses, tenderness or nipple discharge and no palpable axillary masses or adenopathy  CV: regular rate and rhythm, normal S1 S2, no S3 or S4, no murmur, click or rub, no peripheral edema and peripheral pulses strong  ABDOMEN: soft, nontender, no hepatosplenomegaly, no masses and bowel sounds normal   (female): normal female external genitalia, normal urethral meatus, vaginal mucosa pink, moist, well rugated, and normal cervix/adnexa/uterus without masses or discharge  MS: no gross musculoskeletal defects noted, no edema  SKIN: no suspicious lesions or rashes  NEURO: Normal strength and tone, mentation intact and speech normal  PSYCH: mentation appears normal, affect normal/bright    Diagnostic Test Results:  Labs reviewed in Epic  No " "results found for any visits on 11/18/22.    ASSESSMENT/PLAN:   1. Encounter for routine history and physical exam in female patient    - VENOUS COLLECTION  - Lipid Panel (BFP)  - Glucose Fasting (BFP)    2. Screening for cervical cancer    - ThinPrep Pap and HPV (mRNa E6/E7) ([facility])    3. Family history of malignant neoplasm of ovary  Referral done.  - Cancer Risk Mgmt/Cancer Genetic Counseling Referral  - CT Abdomen Pelvis w Contrast  - Radiology Referral    4. Obesity due to excess calories, unspecified obesity severity  Followed by weight loss clinic.    5. Hip pain, right  Schedule apt with Dr. Ornelas for additional evaluation.    6. Abdominal pain, right lower quadrant  Ct scan abd/pelvis. If normal, referral to GI.  - CT Abdomen Pelvis w Contrast  - Radiology Referral    Patient has been advised of split billing requirements and indicates understanding: Yes      COUNSELING:  Reviewed preventive health counseling, as reflected in patient instructions       Regular exercise       Healthy diet/nutrition      BMI:   Estimated body mass index is 36.77 kg/m  as calculated from the following:    Height as of this encounter: 1.664 m (5' 5.5\").    Weight as of this encounter: 101.8 kg (224 lb 6.4 oz).   Weight management plan: Discussed healthy diet and exercise guidelines pt is also seeing weight loss clinic      She reports that she has never smoked. She has never used smokeless tobacco.      Jyotsna De La Cruz MD  [facility]  "

## 2022-11-18 NOTE — NURSING NOTE
Chief Complaint   Patient presents with     Physical     Fasting today      Hip Pain     Right sided hip pain, worse after sitting for a long time, wondering if it is bursitis      Abdominal Pain     RLQ abdominal discomfort for many years, comes and goes, had US last December and it was normal, has tried to change her diet      Menopausal Sx     Discuss painful intercourse, vaginal dryness     Weight Loss     Recently started mounjaro, working on weight loss      Pre-visit Screening:  Immunizations:  not up to date - shingrix at pharmacy  Colonoscopy:  is up to date  Mammogram: is up to date  Asthma Action Test/Plan:  NA  PHQ9:  NA  GAD7:  NA  Questioned patient about current smoking habits Pt. has never smoked.  Ok to leave detailed message on voice mail for today's visit only Yes, phone # [phone]

## 2022-11-23 LAB
CLINICAL HISTORY - QUEST: NORMAL
COMMENT - QUEST: NORMAL
CYTOTECHNOLOGIST - QUEST: NORMAL
DESCRIPTIVE DIAGNOSIS - QUEST: NORMAL
HPV MRNA E6/E7: NOT DETECTED
LAST PAP DX - QUEST: NORMAL
LMP - QUEST: NORMAL
PREV BX DX - QUEST: NORMAL
SOURCE: NORMAL
STATEMENT OF ADEQUACY - QUEST: NORMAL

## 2022-12-03 NOTE — PATIENT INSTRUCTIONS
Colonoscopy recommended  Exercise encouraged  Fasting lipid profile obtained  Mammogram recommended  PAP smear obtained  Routine breast self-exam encouraged  Seat belt use encouraged  Sunscreen recommended  Await labs    
5

## 2023-09-15 ENCOUNTER — LAB (OUTPATIENT)
Dept: LAB | Facility: CLINIC | Age: 53
End: 2023-09-15
Payer: COMMERCIAL

## 2023-09-15 DIAGNOSIS — R79.82 ELEVATED C-REACTIVE PROTEIN (CRP): ICD-10-CM

## 2023-09-15 DIAGNOSIS — E66.9 OBESITY, UNSPECIFIED: ICD-10-CM

## 2023-09-15 DIAGNOSIS — E78.5 HYPERLIPEMIA: ICD-10-CM

## 2023-09-15 LAB
ALBUMIN SERPL BCG-MCNC: 4.5 G/DL (ref 3.5–5.2)
ALP SERPL-CCNC: 68 U/L (ref 35–104)
ALT SERPL W P-5'-P-CCNC: 9 U/L (ref 0–50)
ANION GAP SERPL CALCULATED.3IONS-SCNC: 9 MMOL/L (ref 7–15)
AST SERPL W P-5'-P-CCNC: 16 U/L (ref 0–45)
BILIRUB SERPL-MCNC: 0.4 MG/DL
BUN SERPL-MCNC: 10.5 MG/DL (ref 6–20)
CALCIUM SERPL-MCNC: 8.7 MG/DL (ref 8.6–10)
CHLORIDE SERPL-SCNC: 106 MMOL/L (ref 98–107)
CHOLEST SERPL-MCNC: 154 MG/DL
CREAT SERPL-MCNC: 0.71 MG/DL (ref 0.51–0.95)
CRP SERPL HS-MCNC: 0.65 MG/L
DEPRECATED HCO3 PLAS-SCNC: 25 MMOL/L (ref 22–29)
EGFRCR SERPLBLD CKD-EPI 2021: >90 ML/MIN/1.73M2
GLUCOSE SERPL-MCNC: 87 MG/DL (ref 70–99)
HBA1C MFR BLD: 4.8 %
HDLC SERPL-MCNC: 48 MG/DL
INSULIN SERPL-ACNC: 6.2 UU/ML (ref 2.6–24.9)
LDLC SERPL CALC-MCNC: 98 MG/DL
NONHDLC SERPL-MCNC: 106 MG/DL
POTASSIUM SERPL-SCNC: 4 MMOL/L (ref 3.4–5.3)
PROT SERPL-MCNC: 6.7 G/DL (ref 6.4–8.3)
SODIUM SERPL-SCNC: 140 MMOL/L (ref 136–145)
TRIGL SERPL-MCNC: 39 MG/DL
TSH SERPL DL<=0.005 MIU/L-ACNC: 1.62 UIU/ML (ref 0.3–4.2)

## 2023-09-15 PROCEDURE — 83036 HEMOGLOBIN GLYCOSYLATED A1C: CPT

## 2023-09-15 PROCEDURE — 86141 C-REACTIVE PROTEIN HS: CPT

## 2023-09-15 PROCEDURE — 80053 COMPREHEN METABOLIC PANEL: CPT

## 2023-09-15 PROCEDURE — 83525 ASSAY OF INSULIN: CPT

## 2023-09-15 PROCEDURE — 80061 LIPID PANEL: CPT

## 2023-09-15 PROCEDURE — 36415 COLL VENOUS BLD VENIPUNCTURE: CPT

## 2023-09-15 PROCEDURE — 84443 ASSAY THYROID STIM HORMONE: CPT

## 2023-11-13 NOTE — PROGRESS NOTES
SUBJECTIVE:   CC: Emmie is an 53 year old who presents for preventive health visit.     HPI      Here for a physical.  History right hip bursitis. This seems to be better. Has occ slight soreness, but better than previous.     Ct scan with pulmonary nodules last year, due for repeat ct scan this year.    Has lost a lot of weight, was on munjaro through a program, made a lot of life changes. Feeling very good now.    Also has a vein on the right calf, when standing, is more prominent. Has had this for years, sometimes it will ache.            Social History     Tobacco Use    Smoking status: Never    Smokeless tobacco: Never   Substance Use Topics    Alcohol use: Yes     Alcohol/week: 1.0 standard drink of alcohol     Types: 1 Glasses of wine per week              No data to display            No concerns  Reviewed orders with patient.  Reviewed health maintenance and updated orders accordingly - Yes  BP Readings from Last 3 Encounters:   11/21/23 108/64   11/18/22 118/78   06/07/22 112/76    Wt Readings from Last 3 Encounters:   11/21/23 63 kg (139 lb)   11/18/22 101.8 kg (224 lb 6.4 oz)   06/07/22 95.3 kg (210 lb)                  Patient Active Problem List   Diagnosis    Bee sting reaction    Health Care Home    ACP (advance care planning)    Obesity due to excess calories, unspecified obesity severity    Family history of malignant neoplasm of ovary    Umbilical hernia without obstruction and without gangrene     Past Surgical History:   Procedure Laterality Date    HC DILATION/CURETTAGE DIAG/THER NON OB  2000    D & C    HC TOOTH EXTRACTION W/FORCEP      molar extraction    LASIK CUSTOMVUE BILATERAL  10/22/2012    Procedure: LASIK CUSTOMVUE BILATERAL;  BILATERAL CUSTOMVUE LASIK WITH INTRALASE  ;  Surgeon: Richar Zapata MD;  Location: [facility]    WAVESCAN SCREENING  10/22/2012    Procedure: WAVESCAN SCREENING;  BILATERAL WAVESCAN SCREENING;  Surgeon: Richar Zapata MD;  Location: [facility]    WAVESCAN  SCREENING  10/14/2013    Procedure: WAVESCAN SCREENING;  BILATERAL WAVESCAN ;  Surgeon: Richar Zapata MD;  Location: [facility]    WAVESCAN SCREENING  1/22/2014    Procedure: WAVESCAN SCREENING;  BILATERAL WAVESCREEN;  Surgeon: Richar Zapata MD;  Location: [facility]       Social History     Tobacco Use    Smoking status: Never     Passive exposure: Never    Smokeless tobacco: Never   Substance Use Topics    Alcohol use: Yes     Alcohol/week: 1.0 standard drink of alcohol     Types: 1 Glasses of wine per week     Family History   Problem Relation Age of Onset    Lipids Mother     Asthma Mother     Ovarian Cancer Mother         no genetic    Lipids Father     Heart Disease Father         angioplasty    Alzheimer Disease Sister     Cancer Sister         basal cell carcinoma    Colon Cancer Cousin         diagnose age 52    Ovarian Cancer Cousin         diagnosed early 50s         No current outpatient medications on file.       Breast Cancer Screening:  Any new diagnosis of family breast, ovarian, or bowel cancer? No    FHS-7:       11/18/2022     8:17 AM   Breast CA Risk Assessment (FHS-7)   Did any of your first-degree relatives have breast or ovarian cancer? Yes   Did any of your relatives have bilateral breast cancer? No   Did any man in your family have breast cancer? No   Did any woman in your family have breast and ovarian cancer? No   Did any woman in your family have breast cancer before age 50 y? No   Do you have 2 or more relatives with breast and/or ovarian cancer? No   Do you have 2 or more relatives with breast and/or bowel cancer? Yes     Mammogram ordered.   vasectomy. Still regular periods. Has pain with sex. Would like to see gynecology    Pertinent mammograms are reviewed under the imaging tab.    History of abnormal Pap smear: pap up to date     Reviewed and updated as needed this visit by clinical staff                  Reviewed and updated as needed this visit by Provider                 No  past medical history on file.   Past Surgical History:   Procedure Laterality Date    HC DILATION/CURETTAGE DIAG/THER NON OB  2000    D & C    HC TOOTH EXTRACTION W/FORCEP      molar extraction    LASIK CUSTOMVUE BILATERAL  10/22/2012    Procedure: LASIK CUSTOMVUE BILATERAL;  BILATERAL CUSTOMVUE LASIK WITH INTRALASE  ;  Surgeon: Richar Zapata MD;  Location: [facility]    WAVESCAN SCREENING  10/22/2012    Procedure: WAVESCAN SCREENING;  BILATERAL WAVESCAN SCREENING;  Surgeon: Richar Zapata MD;  Location: [facility]    WAVESCAN SCREENING  10/14/2013    Procedure: WAVESCAN SCREENING;  BILATERAL WAVESCAN ;  Surgeon: Richar Zapata MD;  Location: [facility]    WAVESCAN SCREENING  1/22/2014    Procedure: WAVESCAN SCREENING;  BILATERAL WAVESCREEN;  Surgeon: Richar Zapata MD;  Location: [facility]       Review of Systems  No concerns     OBJECTIVE:   There were no vitals taken for this visit.  Physical Exam  GENERAL: healthy, alert and no distress  EYES: Eyes grossly normal to inspection, PERRL and conjunctivae and sclerae normal  HENT: ear canals and TM's normal, nose and mouth without ulcers or lesions  NECK: no adenopathy, no asymmetry, masses, or scars and thyroid normal to palpation  RESP: lungs clear to auscultation - no rales, rhonchi or wheezes  BREAST: normal without masses, tenderness or nipple discharge and no palpable axillary masses or adenopathy  CV: regular rate and rhythm, normal S1 S2, no S3 or S4, no murmur, click or rub, no peripheral edema and peripheral pulses strong  ABDOMEN: soft, nontender, no hepatosplenomegaly, no masses and bowel sounds normal   (female): normal female external genitalia, normal urethral meatus, vaginal mucosa pink, moist, well rugated, and normal cervix/adnexa/uterus without masses or discharge  MS: no gross musculoskeletal defects noted, no edema  SKIN: no suspicious lesions or rashes  NEURO: Normal strength and tone, mentation intact and speech normal  PSYCH: mentation appears  normal, affect normal/bright    Diagnostic Test Results:  Labs reviewed in Epic  Results for orders placed or performed in visit on 11/21/23   Basic Metabolic Panel (BFP)     Status: None   Result Value Ref Range    Carbon Dioxide 28.0 20 - 32 mmol/L    Creatinine 0.83 0.60 - 1.30 mg/dL    Glucose 80 60 - 99 mg/dL    Sodium 139.0 135 - 146 mmol/L    Potassium 4.07 3.5 - 5.3 mmol/L    Chloride 105.6 98 - 110 mmol/L    Urea Nitrogen 10 7 - 25 mg/dL    Calcium 8.9 8.6 - 10.3 mg/dL    BUN/Creatinine Ratio 12.0 6 - 32   Lipid Panel (BFP)     Status: None   Result Value Ref Range    Cholesterol 161 0 - 199 mg/dL    Triglycerides 51 0 - 149 mg/dL    HDL Cholesterol 55 40 - 150 mg/dL    LDL Cholesterol Direct 96 0 - 130 mg/dL    Cholesterol/HDL Ratio 3 0 - 5       ASSESSMENT/PLAN:   1. Encounter for routine history and physical exam in female patient    - VENOUS COLLECTION  - Basic Metabolic Panel (BFP)  - Lipid Panel (BFP)    2. Obesity due to excess calories, unspecified obesity severity      3. Hip pain, right  This is better, followup to recheck if persistent.    4. Encounter for screening mammogram for breast cancer    - MA Screening Bilateral w/ Kwabena  - Radiology Referral    5. Pulmonary nodules  Referral done for chest ct followup.  - Radiology Referral  - CT Chest w Contrast    6. Family history of malignant neoplasm of ovary  Referral done for genetic counseling.  - Other Specialty Referral    7. Dyspareunia, female  Referral to gynecology, patient request.  - Ob/Gyn  Referral    8. Varicose veins of right lower extremity with other complications  Referral to [facility]  - Vascular Surgery Referral     Patient has been advised of split billing requirements and indicates understanding: Yes      COUNSELING:  Reviewed preventive health counseling, as reflected in patient instructions       Regular exercise       Healthy diet/nutrition      BMI:   Estimated body mass index is 36.77 kg/m  as calculated from the  "following:    Height as of 11/18/22: 1.664 m (5' 5.5\").    Weight as of 11/18/22: 101.8 kg (224 lb 6.4 oz).         She reports that she has never smoked. She has never used smokeless tobacco.          Jyotsna De La Cruz MD  [facility]  "

## 2023-11-21 ENCOUNTER — OFFICE VISIT (OUTPATIENT)
Dept: FAMILY MEDICINE | Facility: CLINIC | Age: 53
End: 2023-11-21

## 2023-11-21 VITALS
SYSTOLIC BLOOD PRESSURE: 108 MMHG | OXYGEN SATURATION: 97 % | BODY MASS INDEX: 22.34 KG/M2 | DIASTOLIC BLOOD PRESSURE: 64 MMHG | TEMPERATURE: 97.9 F | HEIGHT: 66 IN | HEART RATE: 79 BPM | WEIGHT: 139 LBS

## 2023-11-21 DIAGNOSIS — R91.8 PULMONARY NODULES: ICD-10-CM

## 2023-11-21 DIAGNOSIS — E66.09 OBESITY DUE TO EXCESS CALORIES, UNSPECIFIED OBESITY SEVERITY: ICD-10-CM

## 2023-11-21 DIAGNOSIS — I83.891 VARICOSE VEINS OF RIGHT LOWER EXTREMITY WITH OTHER COMPLICATIONS: ICD-10-CM

## 2023-11-21 DIAGNOSIS — Z00.00 ENCOUNTER FOR ROUTINE HISTORY AND PHYSICAL EXAM IN FEMALE PATIENT: Primary | ICD-10-CM

## 2023-11-21 DIAGNOSIS — M25.551 HIP PAIN, RIGHT: ICD-10-CM

## 2023-11-21 DIAGNOSIS — Z80.41 FAMILY HISTORY OF MALIGNANT NEOPLASM OF OVARY: ICD-10-CM

## 2023-11-21 DIAGNOSIS — Z12.31 ENCOUNTER FOR SCREENING MAMMOGRAM FOR BREAST CANCER: ICD-10-CM

## 2023-11-21 DIAGNOSIS — N94.10 DYSPAREUNIA, FEMALE: ICD-10-CM

## 2023-11-21 LAB
BUN SERPL-MCNC: 10 MG/DL (ref 7–25)
BUN/CREATININE RATIO: 12 (ref 6–32)
CALCIUM SERPL-MCNC: 8.9 MG/DL (ref 8.6–10.3)
CHLORIDE SERPLBLD-SCNC: 105.6 MMOL/L (ref 98–110)
CHOLEST SERPL-MCNC: 161 MG/DL (ref 0–199)
CHOLEST/HDLC SERPL: 3 {RATIO} (ref 0–5)
CO2 SERPL-SCNC: 28 MMOL/L (ref 20–32)
CREAT SERPL-MCNC: 0.83 MG/DL (ref 0.6–1.3)
GLUCOSE SERPL-MCNC: 80 MG/DL (ref 60–99)
HDLC SERPL-MCNC: 55 MG/DL (ref 40–150)
LDLC SERPL CALC-MCNC: 96 MG/DL (ref 0–130)
POTASSIUM SERPL-SCNC: 4.07 MMOL/L (ref 3.5–5.3)
SODIUM SERPL-SCNC: 139 MMOL/L (ref 135–146)
TRIGL SERPL-MCNC: 51 MG/DL (ref 0–149)

## 2023-11-21 PROCEDURE — 36415 COLL VENOUS BLD VENIPUNCTURE: CPT | Performed by: FAMILY MEDICINE

## 2023-11-21 PROCEDURE — 80061 LIPID PANEL: CPT | Performed by: FAMILY MEDICINE

## 2023-11-21 PROCEDURE — 99214 OFFICE O/P EST MOD 30 MIN: CPT | Mod: 25 | Performed by: FAMILY MEDICINE

## 2023-11-21 PROCEDURE — 99386 PREV VISIT NEW AGE 40-64: CPT | Mod: 25 | Performed by: FAMILY MEDICINE

## 2023-11-21 PROCEDURE — 80048 BASIC METABOLIC PNL TOTAL CA: CPT | Performed by: FAMILY MEDICINE

## 2023-11-21 NOTE — NURSING NOTE
Chief Complaint   Patient presents with    Physical     Fasting today     Vein on leg     Vein on right leg, has become more noticeable     Results     Discuss CT scan from last year, wondering when to repeat    Hip Pain     Right hip pain, history of bursitits     Pre-visit Screening:  Immunizations:  not up to date - shingrix at pharmacy  Colonoscopy:  is up to date  Mammogram: is due and ordered today  Asthma Action Test/Plan:  NA  PHQ9:  NA  GAD7:  NA  Questioned patient about current smoking habits Pt. has never smoked.  Ok to leave detailed message on voice mail for today's visit only Yes, phone # [phone]

## 2023-12-18 ENCOUNTER — TRANSFERRED RECORDS (OUTPATIENT)
Dept: FAMILY MEDICINE | Facility: CLINIC | Age: 53
End: 2023-12-18

## 2023-12-28 LAB — MAMMOGRAM: NORMAL

## 2023-12-29 ENCOUNTER — TRANSFERRED RECORDS (OUTPATIENT)
Dept: FAMILY MEDICINE | Facility: CLINIC | Age: 53
End: 2023-12-29

## 2024-10-31 ENCOUNTER — TRANSFERRED RECORDS (OUTPATIENT)
Dept: FAMILY MEDICINE | Facility: CLINIC | Age: 54
End: 2024-10-31

## 2024-12-21 ENCOUNTER — HEALTH MAINTENANCE LETTER (OUTPATIENT)
Age: 54
End: 2024-12-21

## 2025-03-22 ENCOUNTER — HEALTH MAINTENANCE LETTER (OUTPATIENT)
Age: 55
End: 2025-03-22

## 2025-04-30 ENCOUNTER — APPOINTMENT (OUTPATIENT)
Age: 55
Setting detail: DERMATOLOGY
End: 2025-04-30

## 2025-04-30 DIAGNOSIS — L82.1 OTHER SEBORRHEIC KERATOSIS: ICD-10-CM

## 2025-04-30 DIAGNOSIS — L57.0 ACTINIC KERATOSIS: ICD-10-CM

## 2025-04-30 DIAGNOSIS — L72.0 EPIDERMAL CYST: ICD-10-CM

## 2025-04-30 PROCEDURE — ? COUNSELING

## 2025-04-30 PROCEDURE — 17003 DESTRUCT PREMALG LES 2-14: CPT

## 2025-04-30 PROCEDURE — ? LIQUID NITROGEN

## 2025-04-30 PROCEDURE — 17000 DESTRUCT PREMALG LESION: CPT

## 2025-04-30 PROCEDURE — 99213 OFFICE O/P EST LOW 20 MIN: CPT | Mod: 25

## 2025-04-30 PROCEDURE — ? ADDITIONAL NOTES

## 2025-04-30 ASSESSMENT — LOCATION ZONE DERM
LOCATION ZONE: FACE
LOCATION ZONE: TRUNK
LOCATION ZONE: NOSE

## 2025-04-30 ASSESSMENT — LOCATION DETAILED DESCRIPTION DERM
LOCATION DETAILED: RIGHT SUPERIOR TEMPLE
LOCATION DETAILED: LEFT MEDIAL SUPERIOR CHEST
LOCATION DETAILED: RIGHT FOREHEAD
LOCATION DETAILED: LEFT LATERAL SUPERIOR CHEST
LOCATION DETAILED: NASAL TIP
LOCATION DETAILED: LEFT CLAVICULAR SKIN

## 2025-04-30 ASSESSMENT — LOCATION SIMPLE DESCRIPTION DERM
LOCATION SIMPLE: RIGHT TEMPLE
LOCATION SIMPLE: NOSE
LOCATION SIMPLE: CHEST
LOCATION SIMPLE: LEFT CLAVICULAR SKIN
LOCATION SIMPLE: RIGHT FOREHEAD

## 2025-04-30 NOTE — PROCEDURE: ADDITIONAL NOTES
Detail Level: Simple
Additional Notes: No evidence of basal cell carcinoma on chest today.
Render Risk Assessment In Note?: no

## 2025-04-30 NOTE — HPI: SKIN LESION
What Type Of Note Output Would You Prefer (Optional)?: Bullet Format
How Severe Is Your Skin Lesion?: mild
Has Your Skin Lesion Been Treated?: not been treated
Is This A New Presentation, Or A Follow-Up?: Skin Lesion
Additional History: Patient presents to clinic to recheck lesion on the left mid chest for possible basal cell carcinoma.

## 2025-05-19 ENCOUNTER — RESULTS FOLLOW-UP (OUTPATIENT)
Dept: FAMILY MEDICINE | Facility: CLINIC | Age: 55
End: 2025-05-19

## 2025-05-19 ENCOUNTER — TRANSFERRED RECORDS (OUTPATIENT)
Dept: FAMILY MEDICINE | Facility: CLINIC | Age: 55
End: 2025-05-19